# Patient Record
Sex: MALE
[De-identification: names, ages, dates, MRNs, and addresses within clinical notes are randomized per-mention and may not be internally consistent; named-entity substitution may affect disease eponyms.]

---

## 2023-07-10 PROBLEM — Z00.00 ENCOUNTER FOR PREVENTIVE HEALTH EXAMINATION: Status: ACTIVE | Noted: 2023-07-10

## 2023-07-11 ENCOUNTER — APPOINTMENT (OUTPATIENT)
Dept: ORTHOPEDIC SURGERY | Facility: CLINIC | Age: 43
End: 2023-07-11
Payer: COMMERCIAL

## 2023-07-11 VITALS
HEIGHT: 64 IN | WEIGHT: 155 LBS | BODY MASS INDEX: 26.46 KG/M2 | DIASTOLIC BLOOD PRESSURE: 88 MMHG | HEART RATE: 83 BPM | SYSTOLIC BLOOD PRESSURE: 126 MMHG

## 2023-07-11 DIAGNOSIS — F17.200 NICOTINE DEPENDENCE, UNSPECIFIED, UNCOMPLICATED: ICD-10-CM

## 2023-07-11 DIAGNOSIS — M25.572 PAIN IN LEFT ANKLE AND JOINTS OF LEFT FOOT: ICD-10-CM

## 2023-07-11 DIAGNOSIS — Z78.9 OTHER SPECIFIED HEALTH STATUS: ICD-10-CM

## 2023-07-11 DIAGNOSIS — M25.531 PAIN IN RIGHT WRIST: ICD-10-CM

## 2023-07-11 PROCEDURE — 73110 X-RAY EXAM OF WRIST: CPT | Mod: RT

## 2023-07-11 PROCEDURE — 73610 X-RAY EXAM OF ANKLE: CPT | Mod: LT

## 2023-07-11 PROCEDURE — 99204 OFFICE O/P NEW MOD 45 MIN: CPT

## 2023-07-11 RX ORDER — MELOXICAM 15 MG/1
15 TABLET ORAL DAILY
Qty: 30 | Refills: 2 | Status: ACTIVE | COMMUNITY
Start: 2023-07-11 | End: 1900-01-01

## 2023-07-11 NOTE — PHYSICAL EXAM
[de-identified] : R elbow:\par Skin intact\par Tender at lateral epicondyle\par Pain w resisted WE\par Nontender at medial epicondyle, anterior elbow\par Full strength of biceps/triceps, (-) hook test\par Stable to v/v stress\par Full wrist/hand ROM\par 0-135 elbow rom\par 80/80 pronation/supination\par 5/5 epl fdp io\par SILT amur\par 2+ rad bcr\par \par Constitutional: Well-nourished, well-developed, No acute distress \par Respiratory:  Good respiratory effort, no SOB \par Psychiatric: Pleasant and normal affect, alert and oriented x3 \par \par Ankle:\par Skin: Clean dry and intact \par Ankle: no swelling\par Tenderness: ATFL, CFL, Lateral ankle \par ROM: nl\par 5/5 EHL TA GS\par SILT L3-S1\par 2+ dp pt wwp bcr \par   [de-identified] : The following radiographs were ordered and read by me during this patient's visit. I reviewed each radiograph in detail with the patient and discussed the findings as highlighted below.   3 views of the L ankle were obtained today that show no fracture, or dislocation. There are no degenerative changes seen. There is no malalignment. No obvious osseous abnormality. Otherwise unremarkable. \par \par The following radiographs were ordered and read by me during this patient's visit. I reviewed each radiograph in detail with the patient and discussed the findings as highlighted below.   3 views of the R wrist were obtained today that show no fracture, or dislocation. There are no degenerative changes seen. There is no malalignment. No obvious osseous abnormality. Otherwise unremarkable.

## 2023-07-11 NOTE — HISTORY OF PRESENT ILLNESS
[de-identified] : 42yM pw R elbow/wrist pain and L ankle pain.  Works as  and notes with heavy forceful actions he has radiating pain, no n/p - pain is mostly in wrist and mid forearm but radiates up.  Ankle pain is atraumatic and intermittent, has mostly resolved in last 2 days since making appt.  Hx of ankle sprains.

## 2023-07-11 NOTE — DISCUSSION/SUMMARY
[de-identified] : 42yM w R lateral epicondylitis and likely L ankle sprain.  The patient was extensively counseled on treatment options including but not limited to observation, rest/activity modification, bracing, anti-inflammatory medications, physical therapy, injections, and surgery.  The natural history of the disease was thoroughly explained.  We discussed that the majority of the time, this condition can be initially treated conservatively.\par The patient will proceed with:\par -refuses pt/ot at this time\par -removable wrist splint, laceup ankle brace\par -meloxicam rx provided - pt instructed to take with meals and not with other nsaid's including advil, aleve, and toradol.  The pt understands to stop taking the medication if any stomach sx develop or they develop any type of bleeding or bruising, at which point they will present to their PMD\par -pt was instructed on the importance of resting, icing and elevating to minimize swelling\par -RTC 6w\par \par I have personally obtained the history, reviewed the ROS as noted, and performed the physical examination today.  The patient and I discussed the assessment and options and developed the plan.  All questions were answered and the patient stated their understanding of the treatment plan and appreciation of the visit.\par \par My cumulative time spent on this patient's visit included: Preparation for the visit, review of the medical records, review of pertinent diagnostic studies, examination and counseling of the patient on the above diagnosis, treatment plan and prognosis, orders of diagnostic tests, medications and/or appropriate procedures and documentation in the medical records of today's visit. \par \par Teofilo Painting MD